# Patient Record
Sex: FEMALE | Race: WHITE | URBAN - METROPOLITAN AREA
[De-identification: names, ages, dates, MRNs, and addresses within clinical notes are randomized per-mention and may not be internally consistent; named-entity substitution may affect disease eponyms.]

---

## 2021-05-04 ENCOUNTER — TELEPHONE ENCOUNTER (OUTPATIENT)
Dept: URBAN - METROPOLITAN AREA CLINIC 74 | Facility: CLINIC | Age: 26
End: 2021-05-04

## 2021-05-04 ENCOUNTER — OUT OF OFFICE VISIT (OUTPATIENT)
Dept: URBAN - METROPOLITAN AREA MEDICAL CENTER 9 | Facility: MEDICAL CENTER | Age: 26
End: 2021-05-04
Payer: COMMERCIAL

## 2021-05-04 DIAGNOSIS — K86.1 AUTOIMMUNE PANCREATITIS: ICD-10-CM

## 2021-05-04 DIAGNOSIS — R10.13 ABDOMINAL DISCOMFORT, EPIGASTRIC: ICD-10-CM

## 2021-05-04 DIAGNOSIS — K85.80 OTHER ACUTE PANCREATITIS WITHOUT INFECTION OR NECROSIS: ICD-10-CM

## 2021-05-04 DIAGNOSIS — R11.2 ACUTE NAUSEA WITH NONBILIOUS VOMITING: ICD-10-CM

## 2021-05-04 PROCEDURE — 99222 1ST HOSP IP/OBS MODERATE 55: CPT | Performed by: PHYSICIAN ASSISTANT

## 2021-05-04 PROCEDURE — G8427 DOCREV CUR MEDS BY ELIG CLIN: HCPCS | Performed by: PHYSICIAN ASSISTANT

## 2021-05-04 PROCEDURE — 99232 SBSQ HOSP IP/OBS MODERATE 35: CPT | Performed by: PHYSICIAN ASSISTANT

## 2021-05-07 ENCOUNTER — OUT OF OFFICE VISIT (OUTPATIENT)
Dept: URBAN - METROPOLITAN AREA MEDICAL CENTER 9 | Facility: MEDICAL CENTER | Age: 26
End: 2021-05-07
Payer: COMMERCIAL

## 2021-05-07 DIAGNOSIS — R74.01 ALT (SGPT) LEVEL RAISED: ICD-10-CM

## 2021-05-07 DIAGNOSIS — R11.2 ACUTE NAUSEA WITH NONBILIOUS VOMITING: ICD-10-CM

## 2021-05-07 DIAGNOSIS — R10.13 ABDOMINAL DISCOMFORT, EPIGASTRIC: ICD-10-CM

## 2021-05-07 DIAGNOSIS — K85.80 OTHER ACUTE PANCREATITIS WITHOUT INFECTION OR NECROSIS: ICD-10-CM

## 2021-05-07 PROCEDURE — 99232 SBSQ HOSP IP/OBS MODERATE 35: CPT | Performed by: PHYSICIAN ASSISTANT

## 2022-01-24 ENCOUNTER — LAB OUTSIDE AN ENCOUNTER (OUTPATIENT)
Dept: URBAN - METROPOLITAN AREA CLINIC 40 | Facility: CLINIC | Age: 27
End: 2022-01-24

## 2022-01-24 ENCOUNTER — OFFICE VISIT (OUTPATIENT)
Dept: URBAN - METROPOLITAN AREA CLINIC 40 | Facility: CLINIC | Age: 27
End: 2022-01-24
Payer: COMMERCIAL

## 2022-01-24 ENCOUNTER — WEB ENCOUNTER (OUTPATIENT)
Dept: URBAN - METROPOLITAN AREA CLINIC 40 | Facility: CLINIC | Age: 27
End: 2022-01-24

## 2022-01-24 ENCOUNTER — TELEPHONE ENCOUNTER (OUTPATIENT)
Dept: URBAN - METROPOLITAN AREA CLINIC 40 | Facility: CLINIC | Age: 27
End: 2022-01-24

## 2022-01-24 ENCOUNTER — DASHBOARD ENCOUNTERS (OUTPATIENT)
Age: 27
End: 2022-01-24

## 2022-01-24 DIAGNOSIS — K58.0 IRRITABLE BOWEL SYNDROME WITH DIARRHEA: ICD-10-CM

## 2022-01-24 DIAGNOSIS — K85.10 ACUTE BILIARY PANCREATITIS WITHOUT INFECTION OR NECROSIS: ICD-10-CM

## 2022-01-24 DIAGNOSIS — R10.13 EPIGASTRIC ABDOMINAL PAIN: ICD-10-CM

## 2022-01-24 DIAGNOSIS — K21.9 GERD WITHOUT ESOPHAGITIS: ICD-10-CM

## 2022-01-24 PROBLEM — 266435005: Status: ACTIVE | Noted: 2022-01-24

## 2022-01-24 PROBLEM — 197125005: Status: ACTIVE | Noted: 2022-01-24

## 2022-01-24 PROBLEM — 79922009: Status: ACTIVE | Noted: 2022-01-24

## 2022-01-24 PROCEDURE — 99214 OFFICE O/P EST MOD 30 MIN: CPT | Performed by: INTERNAL MEDICINE

## 2022-01-24 RX ORDER — NORETHINDRONE ACETATE AND ETHINYL ESTRADIOL 1.5; 3 MG/1; UG/1
TABLET ORAL
Qty: 0 | Refills: 0 | Status: ON HOLD | COMMUNITY
Start: 1900-01-01

## 2022-01-24 RX ORDER — PANTOPRAZOLE SODIUM 40 MG/1
1 TABLET TABLET, DELAYED RELEASE ORAL ONCE A DAY
Qty: 90 | Refills: 3 | OUTPATIENT
Start: 2022-01-24

## 2022-01-24 NOTE — PHYSICAL EXAM GASTROINTESTINAL
Abdomen , soft, TTP diffusely-mild, nondistended , no guarding or rigidity , no masses palpable , normal bowel sounds

## 2022-01-24 NOTE — HPI-TODAY'S VISIT:
26-year-old  female here for follow-up after recent ER visit for abdominal pain/ nausea/ vomiting.  She has history of acute pancreatitis.  She was in the hospital last May with it.  Extended work-up did not reveal any etiology except for possible Motrin use prior to her episode.  Status post cholecystectomy in 2016 after an episode of pancreatitis.  IgG 4 levels were normal.  normal pancreatic elastase. MARQUIS/AMA-negative. hepatitis serologies-negative. Recent CT from the ER in December did not show any signs of pancreatitis.  Lipase was normal.  She will she went to the ER after 10 days of the onset of symptoms, so she thinks she may have been already over her episode of pancreatitis by then.  Has history of IBS.  No recent upper endoscopy.  Mother has chronic pancreatitis.  She does not drink alcohol.  Has reflux -not well controlled. Not on any medications for it. poor overall compliance

## 2022-03-16 ENCOUNTER — TELEPHONE ENCOUNTER (OUTPATIENT)
Dept: URBAN - METROPOLITAN AREA CLINIC 40 | Facility: CLINIC | Age: 27
End: 2022-03-16

## 2022-03-18 ENCOUNTER — OFFICE VISIT (OUTPATIENT)
Dept: URBAN - METROPOLITAN AREA MEDICAL CENTER 28 | Facility: MEDICAL CENTER | Age: 27
End: 2022-03-18
Payer: COMMERCIAL

## 2022-03-18 DIAGNOSIS — K86.89 ACUTE PANCREATIC FLUID COLLECTION: ICD-10-CM

## 2022-03-18 DIAGNOSIS — R10.13 ABDOMINAL DISCOMFORT, EPIGASTRIC: ICD-10-CM

## 2022-03-18 DIAGNOSIS — K31.89 ACQUIRED DEFORMITY OF DUODENUM: ICD-10-CM

## 2022-03-18 PROCEDURE — 43239 EGD BIOPSY SINGLE/MULTIPLE: CPT | Performed by: STUDENT IN AN ORGANIZED HEALTH CARE EDUCATION/TRAINING PROGRAM

## 2022-03-18 PROCEDURE — 43259 EGD US EXAM DUODENUM/JEJUNUM: CPT | Performed by: STUDENT IN AN ORGANIZED HEALTH CARE EDUCATION/TRAINING PROGRAM

## 2025-07-10 ENCOUNTER — DASHBOARD ENCOUNTERS (OUTPATIENT)
Age: 30
End: 2025-07-10

## 2025-07-14 ENCOUNTER — LAB OUTSIDE AN ENCOUNTER (OUTPATIENT)
Dept: URBAN - METROPOLITAN AREA CLINIC 40 | Facility: CLINIC | Age: 30
End: 2025-07-14

## 2025-07-14 ENCOUNTER — OFFICE VISIT (OUTPATIENT)
Dept: URBAN - METROPOLITAN AREA CLINIC 40 | Facility: CLINIC | Age: 30
End: 2025-07-14
Payer: COMMERCIAL

## 2025-07-14 DIAGNOSIS — Z90.49 HX OF CHOLECYSTECTOMY: ICD-10-CM

## 2025-07-14 DIAGNOSIS — Z87.19 HX OF PANCREATITIS: ICD-10-CM

## 2025-07-14 DIAGNOSIS — K86.1 OTHER CHRONIC PANCREATITIS: ICD-10-CM

## 2025-07-14 DIAGNOSIS — N80.9 ENDOMETRIOSIS: ICD-10-CM

## 2025-07-14 DIAGNOSIS — R19.5 MUCUS IN STOOL: ICD-10-CM

## 2025-07-14 DIAGNOSIS — Z83.79 FAMILY HISTORY OF PANCREATITIS: ICD-10-CM

## 2025-07-14 DIAGNOSIS — K52.9 CHRONIC DIARRHEA: ICD-10-CM

## 2025-07-14 DIAGNOSIS — R10.30 LOWER ABDOMINAL PAIN: ICD-10-CM

## 2025-07-14 DIAGNOSIS — R10.13 EPIGASTRIC ABDOMINAL PAIN: ICD-10-CM

## 2025-07-14 PROBLEM — 428882003: Status: ACTIVE | Noted: 2025-07-14

## 2025-07-14 PROBLEM — 129103003: Status: ACTIVE | Noted: 2025-07-14

## 2025-07-14 PROBLEM — 235494005: Status: ACTIVE | Noted: 2025-07-14

## 2025-07-14 PROCEDURE — 99204 OFFICE O/P NEW MOD 45 MIN: CPT | Performed by: INTERNAL MEDICINE

## 2025-07-14 RX ORDER — FAMOTIDINE 40 MG/1
1 TABLET AT BEDTIME TABLET, FILM COATED ORAL ONCE A DAY
Status: ACTIVE | COMMUNITY

## 2025-07-14 RX ORDER — VALACYCLOVIR 1 G/1
1 TABLET TABLET, FILM COATED ORAL ONCE A DAY
Status: ACTIVE | COMMUNITY

## 2025-07-14 RX ORDER — ALPRAZOLAM 1 MG/1
1 TABLET TABLET ORAL TWICE A DAY
Status: ACTIVE | COMMUNITY

## 2025-07-14 RX ORDER — TRAZODONE HYDROCHLORIDE 50 MG/1
1 TABLET AT BEDTIME AS NEEDED TABLET ORAL ONCE A DAY
Status: ACTIVE | COMMUNITY

## 2025-07-14 RX ORDER — CETIRIZINE HYDROCHLORIDE 10 MG/1
1 TABLET TABLET, FILM COATED ORAL ONCE A DAY
Status: ACTIVE | COMMUNITY

## 2025-07-14 NOTE — HPI-TODAY'S VISIT:
- Patient presents for evaluation of recurrent pancreatitis.  See notes below.  She has a history of multiple episodes of recurrent acute pancreatitis with negative workup.  No alcohol use.  She had prior cholecystectomy and there was evidence of chronic cholecystitis but no stones.  Multiple imaging studies have been negative for bile duct stricture or common bile duct stones.  She was admitted to [facility] just a few weeks ago with recurrent pancreatitis like symptoms but they were somewhat atypical from previous episodes.  At this time her lipase it was normal CT normal.  She was told to follow-up with GI.  She did see GI specialists last year and an MRI of the pancreas was recommended but it was not done.  She wishes to transfer care back to us.  She occasionally has diarrhea as well with mucus in the stool and lower abdominal cramping.  She is quite concerned she may have hereditary pancreatitis since her mother also has episodes of pancreatitis.  - EUS with Dr. Ocasio 2022 showed pancreatic parenchymal abnormalities consisting of hyperechoic strands and hypoechoic foci noted in the entire pancreas, no evidence of cysts or pseudocyst or mass or pancreas divisum.  The pancreas duct was within normal limits.  Normal ampulla.  Overall he was unsure of the clinical relevance of the foci of abnormal strands within the pancreas but the differential diagnosis included prior pancreatitis.  He was unable to call chronic pancreatitis at that time.  1. Acute biliary pancreatitis without infection or necrosis - K85.10 (Primary)2. GERD without esophagitis - K21.93. Irritable bowel syndrome with diarrhea - K58.04. Epigastric abdominal pain - R10.[age] year old  female with recurrent episodes of acute pancreatitis. She did have gallstones to begin with. Unfortunately has had acute pancreatitis episodes even after cholecystectomy. We will schedule upper endoscopic ultrasound along with upper endoscopy with Dr. Ocasio to assess further. Need to rule out peptic ulcer disease/gastritis which could be in the differential as well. Has IBS-D. consider course of xifaxan at f/u. We will start her on Protonix 40 mg daily. We will call in for GI cocktail to be used as needed. She will follow up with Dr. Ocasio after the EUS due to his expertise. -- Upper endoscopy/EUS was discussed with the patient. The patient is aware of the risks, including bleeding, perforation, missed lesions and anesthetic complications. The benefits and alternatives have also been discussed. -- EGD/EUS is considered a safe, low risk procedure when performed by a specially trained, experienced doctor. Possible complications may include: A tear through the wall of the esophagus, stomach, or duodenum. Bleeding from the site of a biopsy. Localized irritation of the vein at the IV site. -- The patient was advised to avoid any NSAID's 5 days prior to the procedures.Treatment1. Acute biliary pancreatitis without infection or necrosis  Start Pantoprazole Sodium Tablet Delayed Release, 40 MG, 1 tablet, Orally, Once a day, 90 days, 90, Refills 3      PROCEDURE: EUS Endoscopic ultrasound of upper gastrointestinal tract (78443) (Ordered for 01/24/2022)           Notes :Gentry, Autumn 01/24/2022 10:36:04 AM EST > EGD/ EUS with Dr. Ocasio 2. GERD without esophagitis       PROCEDURE: EGD with biopsy if indicated ([id number]) (Ordered for 01/24/2022) 3. Epigastric abdominal pain       PROCEDURE: EGD with biopsy if indicated ([id number]) (Ordered for 01/24/2022)History of Present IllnessToday's Visit::         26-year-old  female here for follow-up after recent ER visit for abdominal pain/ nausea/ vomiting. She has history of acute pancreatitis. She was in the hospital last May with it. Extended work-up did not reveal any etiology except for possible Motrin use prior to her episode. Status post cholecystectomy in 2016 after an episode of pancreatitis. IgG 4 levels were normal. normal pancreatic elastase. KATIA/AMA-negative. hepatitis serologies-negative. Recent CT from the ER in December did not show any signs of pancreatitis. Lipase was normal. She will she went to the ER after 10 days of the onset of symptoms, so she thinks she may have been already over her episode of pancreatitis by then. Has history of IBS. No recent upper endoscopy. Mother has chronic pancreatitis. She does not drink alcohol. Has reflux -not well controlled. Not on any medications for it. poor overall compliance.

## 2025-07-17 LAB — IGG, SUBCLASS 4: 18.6

## 2025-07-18 ENCOUNTER — TELEPHONE ENCOUNTER (OUTPATIENT)
Dept: URBAN - METROPOLITAN AREA CLINIC 6 | Facility: CLINIC | Age: 30
End: 2025-07-18

## 2025-08-26 ENCOUNTER — TELEPHONE ENCOUNTER (OUTPATIENT)
Dept: URBAN - METROPOLITAN AREA CLINIC 40 | Facility: CLINIC | Age: 30
End: 2025-08-26

## 2025-08-29 ENCOUNTER — OFFICE VISIT (OUTPATIENT)
Dept: URBAN - METROPOLITAN AREA SURGERY CENTER 30 | Facility: SURGERY CENTER | Age: 30
End: 2025-08-29

## 2025-08-29 RX ORDER — ALPRAZOLAM 1 MG/1
1 TABLET TABLET ORAL TWICE A DAY
Status: ACTIVE | COMMUNITY

## 2025-08-29 RX ORDER — CETIRIZINE HYDROCHLORIDE 10 MG/1
1 TABLET TABLET, FILM COATED ORAL ONCE A DAY
Status: ACTIVE | COMMUNITY

## 2025-08-29 RX ORDER — FAMOTIDINE 40 MG/1
1 TABLET AT BEDTIME TABLET, FILM COATED ORAL ONCE A DAY
Status: ACTIVE | COMMUNITY

## 2025-08-29 RX ORDER — VALACYCLOVIR 1 G/1
1 TABLET TABLET, FILM COATED ORAL ONCE A DAY
Status: ACTIVE | COMMUNITY

## 2025-08-29 RX ORDER — TRAZODONE HYDROCHLORIDE 50 MG/1
1 TABLET AT BEDTIME AS NEEDED TABLET ORAL ONCE A DAY
Status: ACTIVE | COMMUNITY